# Patient Record
Sex: FEMALE | HISPANIC OR LATINO | URBAN - METROPOLITAN AREA
[De-identification: names, ages, dates, MRNs, and addresses within clinical notes are randomized per-mention and may not be internally consistent; named-entity substitution may affect disease eponyms.]

---

## 2018-07-27 ENCOUNTER — OFFICE VISIT (OUTPATIENT)
Dept: URBAN - METROPOLITAN AREA CLINIC 23 | Facility: CLINIC | Age: 53
End: 2018-07-27

## 2018-07-27 DIAGNOSIS — H35.342 MACULAR CYST, HOLE, OR PSEUDOHOLE, LEFT EYE: ICD-10-CM

## 2018-07-27 PROCEDURE — 99203 OFFICE O/P NEW LOW 30 MIN: CPT | Performed by: OPTOMETRIST

## 2018-07-27 PROCEDURE — 92133 CPTRZD OPH DX IMG PST SGM ON: CPT | Performed by: OPTOMETRIST

## 2018-07-27 RX ORDER — TIMOLOL MALEATE 5 MG/ML
0.5 % SOLUTION/ DROPS OPHTHALMIC
Qty: 0 | Refills: 0 | Status: ACTIVE
Start: 2018-07-27

## 2018-07-27 ASSESSMENT — INTRAOCULAR PRESSURE
OD: 30
OS: 25

## 2018-07-27 NOTE — IMPRESSION/PLAN
Impression: Anatomical narrow angle of bilateral eyes: H40.033. Plan: Discussed findings. Recommend consult for LPI to prevent angle closure and blindness. Start Istalol qd (drop given in office this afternoon) to decrease pressure and treat narrow angle glaucoma. Avoid medications labeled not to use with glaucoma.

## 2018-07-27 NOTE — IMPRESSION/PLAN
Impression: Macular cyst, hole, or pseudohole, left eye: H35.342. Plan: Discussed findings. Consult with retinal specialist when IOP controlled and safe to dilate.

## 2018-07-31 ENCOUNTER — SURGERY (OUTPATIENT)
Dept: URBAN - METROPOLITAN AREA SURGERY 15 | Facility: SURGERY | Age: 53
End: 2018-07-31

## 2018-07-31 ENCOUNTER — OFFICE VISIT (OUTPATIENT)
Dept: URBAN - METROPOLITAN AREA CLINIC 33 | Facility: CLINIC | Age: 53
End: 2018-07-31

## 2018-07-31 DIAGNOSIS — H35.81 RETINAL EDEMA: ICD-10-CM

## 2018-07-31 DIAGNOSIS — H40.033 ANATOMICAL NARROW ANGLE, BILATERAL: Primary | ICD-10-CM

## 2018-07-31 PROCEDURE — 99212 OFFICE O/P EST SF 10 MIN: CPT | Performed by: OPHTHALMOLOGY

## 2018-07-31 PROCEDURE — 66761 REVISION OF IRIS: CPT | Performed by: OPHTHALMOLOGY

## 2018-07-31 ASSESSMENT — INTRAOCULAR PRESSURE
OD: 17
OS: 16
OS: 16
OD: 17

## 2018-07-31 NOTE — IMPRESSION/PLAN
Impression: Retinal edema: H35.81. OS. Condition: new prob, no addtl w/u needed. Plan: Discussed diagnosis in detail with patient. Discussed treatment options with patient. Discussed risks of progression. Consult recommended [Retinal Specialists].

## 2018-07-31 NOTE — IMPRESSION/PLAN
Impression: Anatomical narrow angle, bilateral: H40.033. Symptoms: will improve with surgery. Vision: vision threatening. IOP effectively lowered with Istalol Plan: Discussed diagnosis in detail with patient. Discussed treatment options with patient. Surgical treatment is required. Surgical risks and benefits were discussed, explained and understood by patient. Patient elects to have surgery. RL-2 Continue Timolol QAM OU.

## 2018-08-07 ENCOUNTER — POST-OPERATIVE VISIT (OUTPATIENT)
Dept: URBAN - METROPOLITAN AREA CLINIC 23 | Facility: CLINIC | Age: 53
End: 2018-08-07

## 2018-08-07 DIAGNOSIS — Z09 ENCNTR FOR F/U EXAM AFT TRTMT FOR COND OTH THAN MALIG NEOPLM: Primary | ICD-10-CM

## 2018-08-07 ASSESSMENT — INTRAOCULAR PRESSURE
OS: 14
OD: 23

## 2018-08-24 ENCOUNTER — OFFICE VISIT (OUTPATIENT)
Dept: URBAN - METROPOLITAN AREA CLINIC 23 | Facility: CLINIC | Age: 53
End: 2018-08-24

## 2018-08-24 DIAGNOSIS — H35.372 PUCKERING OF MACULA, LEFT EYE: Primary | ICD-10-CM

## 2018-08-24 PROCEDURE — 92134 CPTRZ OPH DX IMG PST SGM RTA: CPT | Performed by: OPHTHALMOLOGY

## 2018-08-24 PROCEDURE — 92014 COMPRE OPH EXAM EST PT 1/>: CPT | Performed by: OPHTHALMOLOGY

## 2018-08-24 ASSESSMENT — INTRAOCULAR PRESSURE
OD: 18
OS: 16

## 2018-08-24 NOTE — IMPRESSION/PLAN
Impression: Puckering of macula, left eye: H35.372. OS. Condition: stable. Vision: vision not affected. Plan: Discussed diagnosis in detail with patient. Exam OS shows ERM with Pseudohole. No treatment is required at this time. Will continue to observe condition and or symptoms. OCT OS shows irregular foveal contour with Cystic edema OS. If va decreases in the future surgery can be consider, not needed at this time.

## 2018-11-06 ENCOUNTER — OFFICE VISIT (OUTPATIENT)
Dept: URBAN - METROPOLITAN AREA CLINIC 23 | Facility: CLINIC | Age: 53
End: 2018-11-06

## 2018-11-06 DIAGNOSIS — H40.1134 PRIMARY OPEN-ANGLE GLAUCOMA, BILATERAL, INDETERMINATE STAGE: Primary | ICD-10-CM

## 2018-11-06 PROCEDURE — 99213 OFFICE O/P EST LOW 20 MIN: CPT | Performed by: OPTOMETRIST

## 2018-11-06 ASSESSMENT — INTRAOCULAR PRESSURE
OS: 12
OD: 16

## 2018-11-06 NOTE — IMPRESSION/PLAN
Impression: Primary open-angle glaucoma, bilateral, indeterminate stage: H40.1134. Plan: Discussed diagnosis in detail with patient. Discussed treatment options with patient. Reassured patient of current condition and treatment. Will continue to monitor IOP. Continue using current medication(s). Continue istalol QAM OU.

## 2022-08-02 ENCOUNTER — OFFICE VISIT (OUTPATIENT)
Dept: URBAN - METROPOLITAN AREA CLINIC 22 | Facility: CLINIC | Age: 57
End: 2022-08-02

## 2022-08-02 DIAGNOSIS — H40.211 ACUTE ANGLE-CLOSURE GLAUCOMA, RIGHT EYE: Primary | ICD-10-CM

## 2022-08-02 PROCEDURE — 99204 OFFICE O/P NEW MOD 45 MIN: CPT | Performed by: STUDENT IN AN ORGANIZED HEALTH CARE EDUCATION/TRAINING PROGRAM

## 2022-08-02 ASSESSMENT — INTRAOCULAR PRESSURE
OS: 13
OS: 22
OD: 54
OS: 23
OD: 50
OD: 55
OD: 22

## 2022-08-02 NOTE — IMPRESSION/PLAN
Impression: Acute angle-closure glaucoma, right eye: H40.211. Plan: Discussed findings. Elevated IOP 55 OD today. Patient reports onset of pain 4 days ago followed by inferior vision loss. Cupped nerve. Diamox [id number] and Combigan in office, minimal reduction in IOP. Refer to glaucoma specialist today.

## 2022-08-22 ENCOUNTER — TESTING ONLY (OUTPATIENT)
Dept: URBAN - METROPOLITAN AREA CLINIC 23 | Facility: CLINIC | Age: 57
End: 2022-08-22

## 2022-08-22 DIAGNOSIS — H25.13 AGE-RELATED NUCLEAR CATARACT, BILATERAL: Primary | ICD-10-CM

## 2022-08-22 ASSESSMENT — PACHYMETRY
OS: 2.73
OS: 22.00
OD: 2.23
OD: 21.91

## 2022-08-22 ASSESSMENT — VISUAL ACUITY
OD: 20/25
OS: 20/20

## 2022-08-31 ENCOUNTER — SURGERY (OUTPATIENT)
Dept: URBAN - METROPOLITAN AREA SURGERY 11 | Facility: SURGERY | Age: 57
End: 2022-08-31

## 2022-08-31 PROCEDURE — 66174 TRLUML DIL AQ O/F CAN W/O ST: CPT | Performed by: OPHTHALMOLOGY

## 2022-09-01 ENCOUNTER — POST-OPERATIVE VISIT (OUTPATIENT)
Dept: URBAN - METROPOLITAN AREA CLINIC 23 | Facility: CLINIC | Age: 57
End: 2022-09-01

## 2022-09-01 DIAGNOSIS — Z48.810 ENCOUNTER FOR SURGICAL AFTERCARE FOLLOWING SURGERY ON A SENSE ORGAN: Primary | ICD-10-CM

## 2022-09-01 PROCEDURE — 99024 POSTOP FOLLOW-UP VISIT: CPT | Performed by: OPTOMETRIST

## 2022-09-01 RX ORDER — ACETAZOLAMIDE 250 MG/1
250 MG TABLET ORAL
Qty: 20 | Refills: 0 | Status: INACTIVE
Start: 2022-09-01 | End: 2022-09-02

## 2022-09-01 ASSESSMENT — INTRAOCULAR PRESSURE
OD: 47
OS: 20
OD: 48

## 2022-09-01 NOTE — IMPRESSION/PLAN
Impression: S/P Cataract Extraction by phacoemulsification with IOL placement; OMNI Surgical System; Shunt:  Hydrus OD - 1 Day. Encounter for surgical aftercare following surgery on a sense organ  Z48.810. Post operative instructions reviewed - Plan: Use medication as directed above and on handout. IOP elevated today, burped wound but no improvement to IOP. Prescribed Diamox BID x 10 days and advised patient to continue Combigan BID OU. Gave sample of Combigan and placed drop in clinic. Return if vision suddenly changes or pain increases. Recheck IOP tomorrow.

## 2022-09-02 ENCOUNTER — POST-OPERATIVE VISIT (OUTPATIENT)
Dept: URBAN - METROPOLITAN AREA CLINIC 23 | Facility: CLINIC | Age: 57
End: 2022-09-02

## 2022-09-02 PROCEDURE — 99024 POSTOP FOLLOW-UP VISIT: CPT | Performed by: OPTOMETRIST

## 2022-09-02 RX ORDER — ACETAZOLAMIDE 250 MG/1
250 MG TABLET ORAL
Qty: 20 | Refills: 1 | Status: ACTIVE
Start: 2022-09-02

## 2022-09-02 ASSESSMENT — INTRAOCULAR PRESSURE
OD: 32
OS: 13

## 2022-09-02 NOTE — IMPRESSION/PLAN
Impression: S/P Cataract Extraction by phacoemulsification with IOL placement; OMNI Surgical System; Shunt:  Hydrus OD - 2 Days. Encounter for surgical aftercare following surgery on a sense organ  Z48.810. Post operative instructions reviewed - Condition is improving - Plan: Discussed findings. IOP lowered today but still remains elevated. Advised patient to continue Diamox along with drop schedule. Discussed increasing Diamox amount to lower IOP quicker, recommend increasing to TID. Side effects of Diamox discussed, patient to reduce dosage if side effects occur. Call with any worsening pain or vision changes.

## 2022-09-08 ENCOUNTER — POST-OPERATIVE VISIT (OUTPATIENT)
Dept: URBAN - METROPOLITAN AREA CLINIC 23 | Facility: CLINIC | Age: 57
End: 2022-09-08

## 2022-09-08 DIAGNOSIS — Z48.810 ENCOUNTER FOR SURGICAL AFTERCARE FOLLOWING SURGERY ON A SENSE ORGAN: Primary | ICD-10-CM

## 2022-09-08 PROCEDURE — 99024 POSTOP FOLLOW-UP VISIT: CPT | Performed by: OPTOMETRIST

## 2022-09-08 ASSESSMENT — INTRAOCULAR PRESSURE
OD: 21
OS: 10
OD: 22

## 2022-09-08 NOTE — IMPRESSION/PLAN
Impression: S/P Cataract Extraction by phacoemulsification with IOL placement; OMNI Surgical System; Shunt:  Hydrus OD - 8 Days. Encounter for surgical aftercare following surgery on a sense organ  Z48.810. Post operative instructions reviewed - Condition is improving - Plan: Discussed findings. Systemic side effects noted from Diamox, patient to cut back to BID. IOP lowered, continue Combigan. Advised patient there are refills on all drops at pharmacy. Okay to proceed with second eye. Call with any sooner vision changes or increasing pain.

## 2022-09-20 ENCOUNTER — SURGERY (OUTPATIENT)
Dept: URBAN - METROPOLITAN AREA SURGERY 11 | Facility: SURGERY | Age: 57
End: 2022-09-20

## 2022-09-21 ENCOUNTER — POST-OPERATIVE VISIT (OUTPATIENT)
Dept: URBAN - METROPOLITAN AREA CLINIC 23 | Facility: CLINIC | Age: 57
End: 2022-09-21

## 2022-09-21 DIAGNOSIS — Z48.810 ENCOUNTER FOR SURGICAL AFTERCARE FOLLOWING SURGERY ON A SENSE ORGAN: Primary | ICD-10-CM

## 2022-09-21 PROCEDURE — 99024 POSTOP FOLLOW-UP VISIT: CPT | Performed by: OPTOMETRIST

## 2022-09-21 RX ORDER — BRINZOLAMIDE/BRIMONIDINE TARTRATE 10; 2 MG/ML; MG/ML
SUSPENSION/ DROPS OPHTHALMIC
Qty: 5 | Refills: 0 | Status: ACTIVE
Start: 2022-09-21

## 2022-09-21 RX ORDER — TIMOLOL 5.12 MG/ML
0.5 % SOLUTION/ DROPS OPHTHALMIC
Qty: 5 | Refills: 0 | Status: ACTIVE
Start: 2022-09-21

## 2022-09-21 ASSESSMENT — INTRAOCULAR PRESSURE
OS: 10
OD: 33

## 2022-09-21 NOTE — IMPRESSION/PLAN
Impression: S/P Cataract Extraction by phacoemulsification with IOL placement; OMNI Surgery System; Shunt:  Hydrus OS - 1 Day. Encounter for surgical aftercare following surgery on a sense organ  Z48.810. Post operative instructions reviewed - Plan: Discussed findings. IOP still elevated in OD, patient to stop Combigan and begin Simbrinza BID OD and Betimol BID OD, samples given. Begin new post-surgery drops in OS. Call with any worsening vision or pain.

## 2022-09-28 ENCOUNTER — POST-OPERATIVE VISIT (OUTPATIENT)
Dept: URBAN - METROPOLITAN AREA CLINIC 23 | Facility: CLINIC | Age: 57
End: 2022-09-28

## 2022-09-28 DIAGNOSIS — Z48.810 ENCOUNTER FOR SURGICAL AFTERCARE FOLLOWING SURGERY ON A SENSE ORGAN: Primary | ICD-10-CM

## 2022-09-28 PROCEDURE — 99024 POSTOP FOLLOW-UP VISIT: CPT | Performed by: OPTOMETRIST

## 2022-09-28 ASSESSMENT — INTRAOCULAR PRESSURE
OS: 11
OD: 8

## 2022-09-28 NOTE — IMPRESSION/PLAN
Impression: S/P CE/Standard IOL CC60WF +26.50 w/ OMNI Surgery System; Shunt: Hydrus OS - 8 Days. Encounter for surgical aftercare following surgery on a sense organ  Z48.810. Post operative instructions reviewed - Condition is improving - Plan: Discussed findings. IOP lowered today, discussed need to reduce acetazolamide. Taper to QD, continue all glaucoma drops and post surgical drops. If IOP remains low, will likely remove acetazolamide fully at next visit. Discussed need to return with Dr. Fabiola Brito before PO period ends.

## 2022-10-19 ENCOUNTER — POST-OPERATIVE VISIT (OUTPATIENT)
Dept: URBAN - METROPOLITAN AREA CLINIC 23 | Facility: CLINIC | Age: 57
End: 2022-10-19

## 2022-10-19 DIAGNOSIS — Z48.810 ENCOUNTER FOR SURGICAL AFTERCARE FOLLOWING SURGERY ON A SENSE ORGAN: Primary | ICD-10-CM

## 2022-10-19 PROCEDURE — 92083 EXTENDED VISUAL FIELD XM: CPT | Performed by: OPTOMETRIST

## 2022-10-19 PROCEDURE — 99024 POSTOP FOLLOW-UP VISIT: CPT | Performed by: OPTOMETRIST

## 2022-10-19 RX ORDER — TIMOLOL MALEATE 5 MG/ML
0.5 % SOLUTION/ DROPS OPHTHALMIC
Qty: 5 | Refills: 3 | Status: ACTIVE
Start: 2022-10-19

## 2022-10-19 ASSESSMENT — INTRAOCULAR PRESSURE
OD: 17
OS: 13

## 2022-10-19 NOTE — IMPRESSION/PLAN
Impression: S/P CE/Standard IOL CC60WF +26.50 w/ OMNI Surgery System; Shunt: Hydrus OS - 29 Days. Encounter for surgical aftercare following surgery on a sense organ  Z48.810. Post operative instructions reviewed - Plan: Discussed findings. Reviewed drop instructions with patient. IOP in normal range with use of medications. Stop acetazolamide due to side effects and return for IOP check next week. patient

## 2022-10-26 ENCOUNTER — POST-OPERATIVE VISIT (OUTPATIENT)
Dept: URBAN - METROPOLITAN AREA CLINIC 23 | Facility: CLINIC | Age: 57
End: 2022-10-26

## 2022-10-26 DIAGNOSIS — Z48.810 ENCOUNTER FOR SURGICAL AFTERCARE FOLLOWING SURGERY ON A SENSE ORGAN: Primary | ICD-10-CM

## 2022-10-26 PROCEDURE — 99024 POSTOP FOLLOW-UP VISIT: CPT | Performed by: OPTOMETRIST

## 2022-10-26 RX ORDER — BRIMONIDINE TARTRATE 1.5 MG/ML
0.15 % SOLUTION/ DROPS OPHTHALMIC
Qty: 10 | Refills: 0 | Status: ACTIVE
Start: 2022-10-26

## 2022-10-26 ASSESSMENT — INTRAOCULAR PRESSURE
OS: 9
OD: 9

## 2022-10-26 NOTE — IMPRESSION/PLAN
Impression: S/P CE/Standard IOL CC60WF +26.50 w/ OMNI Surgery System; Shunt: Hydrus OS - 36 Days. Encounter for surgical aftercare following surgery on a sense organ  Z48.810. Excellent post op course   Condition is improving - Plan: Discussed findings. Adjusted glaucoma drops, IOP remaining low. Sent new drops to pharmacy. Call with any worsening symptoms.

## 2022-11-04 ENCOUNTER — POST-OPERATIVE VISIT (OUTPATIENT)
Dept: URBAN - METROPOLITAN AREA CLINIC 24 | Facility: CLINIC | Age: 57
End: 2022-11-04

## 2022-11-04 DIAGNOSIS — H40.1121 PRIMARY OPEN-ANGLE GLAUCOMA, LEFT EYE, MILD STAGE: ICD-10-CM

## 2022-11-04 DIAGNOSIS — H40.1112 PRIMARY OPEN-ANGLE GLAUCOMA, RIGHT EYE, MODERATE STAGE: Primary | ICD-10-CM

## 2022-11-04 PROCEDURE — 99214 OFFICE O/P EST MOD 30 MIN: CPT | Performed by: OPHTHALMOLOGY

## 2022-11-04 RX ORDER — PREDNISOLONE ACETATE 10 MG/ML
1 % SUSPENSION/ DROPS OPHTHALMIC
Qty: 5 | Refills: 1 | Status: ACTIVE
Start: 2022-11-04

## 2022-11-04 RX ORDER — ERYTHROMYCIN 5 MG/G
OINTMENT OPHTHALMIC
Qty: 3.5 | Refills: 1 | Status: ACTIVE
Start: 2022-11-04

## 2022-11-04 RX ORDER — OFLOXACIN 3 MG/ML
0.3 % SOLUTION/ DROPS OPHTHALMIC
Qty: 5 | Refills: 1 | Status: ACTIVE
Start: 2022-11-04

## 2022-11-04 ASSESSMENT — INTRAOCULAR PRESSURE
OD: 20
OS: 9

## 2022-11-04 NOTE — IMPRESSION/PLAN
Impression: Primary open-angle glaucoma, right eye, moderate stage: H40.1112. Plan: Pt has Glaucoma    Gonio :  ANU OD/TM OS      Pachs:  422/240    Today's IOP : 20, 9        Tmax  :  55/25 Target IOP mid to low teens Pt denies Fhx of Glaucoma Left eye is the better seeing eye  
C/D: .9x.9//.45x.45 
OCT: 79/97 (8/2/22) Pt denies Sulfa Allergy   // Pt denies Lung /Heart dx Plan :
1. Continue Brim BID OU Timolol BID OU 2. Patient's IOP OD is too high for amount of glaucoma damage. Recommend BAERVELDT Tube Shunt to lower IOP. The Patient is aware that the risks of BAERVELDT tube shunt OD include but are not limited to complete blindness , loss of vision and loss of the eye, bleeding, infection, inflammation, Hypotony, Persistent IOP elevation, intractable diplopia , and orbital or ocular inflammation. The Patient is aware that there is a 10 % chance of permanent double vision which cannot be remedied by any means including glasses or strabismus surgery. The patient is aware that failure to lower IOP will likely lead to progressive sight loss and possibly blindness . Pt understands and wishes to proceed. ** Patient to use Pred QID x 6 weeks THEN taper as instructed by Dr. Eileen Villalta **PCC's Please notify OR to order device 3.  Schedule Baerveldt Tube + K Patch OD

## 2022-11-16 ENCOUNTER — POST-OPERATIVE VISIT (OUTPATIENT)
Dept: URBAN - METROPOLITAN AREA CLINIC 23 | Facility: CLINIC | Age: 57
End: 2022-11-16

## 2022-11-16 DIAGNOSIS — Z48.810 ENCOUNTER FOR SURGICAL AFTERCARE FOLLOWING SURGERY ON A SENSE ORGAN: Primary | ICD-10-CM

## 2022-11-16 PROCEDURE — 99024 POSTOP FOLLOW-UP VISIT: CPT | Performed by: OPTOMETRIST

## 2022-11-16 ASSESSMENT — INTRAOCULAR PRESSURE
OD: 21
OS: 12

## 2022-11-16 NOTE — IMPRESSION/PLAN
Impression: S/P CE/Standard IOL CC60WF +26.50 w/ OMNI Surgery System; Shunt: Hydrus OS - 57 Days. Encounter for surgical aftercare following surgery on a sense organ  Z48.810. Condition is improving - Plan: Discussed findings. IOLs stable. Wait on refraction until after eye has healed from Baerveldt surgery in OD. Call with any problems.

## 2022-11-24 ENCOUNTER — POST-OPERATIVE VISIT (OUTPATIENT)
Dept: URBAN - METROPOLITAN AREA CLINIC 23 | Facility: CLINIC | Age: 57
End: 2022-11-24

## 2022-11-24 DIAGNOSIS — Z48.810 ENCOUNTER FOR SURGICAL AFTERCARE FOLLOWING SURGERY ON A SENSE ORGAN: Primary | ICD-10-CM

## 2022-11-24 PROCEDURE — 99024 POSTOP FOLLOW-UP VISIT: CPT | Performed by: OPTOMETRIST

## 2022-11-24 ASSESSMENT — INTRAOCULAR PRESSURE
OS: 14
OD: 18

## 2022-11-24 NOTE — IMPRESSION/PLAN
Impression: S/P Shunt:  Baerveldt with CorneaGen (halfmoon) graft OD - 1 Day. Encounter for surgical aftercare following surgery on a sense organ  Z48.810. Plan: The surgical eye(s) is improving well. Continue to follow management and post operative instructions. Start Pred Acetate QID OD and Ofloxacin QID OD. Continue Brimonidine BID OD and Timolol BID OD. Recommend artificial tears throughout post operative period. RTC for scheduled follow up.

## 2022-11-30 ENCOUNTER — POST-OPERATIVE VISIT (OUTPATIENT)
Dept: URBAN - METROPOLITAN AREA CLINIC 23 | Facility: CLINIC | Age: 57
End: 2022-11-30

## 2022-11-30 DIAGNOSIS — Z48.810 ENCOUNTER FOR SURGICAL AFTERCARE FOLLOWING SURGERY ON A SENSE ORGAN: Primary | ICD-10-CM

## 2022-11-30 PROCEDURE — 99024 POSTOP FOLLOW-UP VISIT: CPT | Performed by: OPTOMETRIST

## 2022-11-30 RX ORDER — BRIMONIDINE TARTRATE 1.5 MG/ML
0.15 % SOLUTION/ DROPS OPHTHALMIC
Qty: 10 | Refills: 6 | Status: ACTIVE
Start: 2022-11-30

## 2022-11-30 RX ORDER — PREDNISOLONE ACETATE 10 MG/ML
1 % SUSPENSION/ DROPS OPHTHALMIC
Qty: 10 | Refills: 3 | Status: ACTIVE
Start: 2022-11-30

## 2022-11-30 RX ORDER — TIMOLOL MALEATE 5 MG/ML
0.5 % SOLUTION/ DROPS OPHTHALMIC
Qty: 5 | Refills: 3 | Status: ACTIVE
Start: 2022-11-30

## 2022-11-30 ASSESSMENT — INTRAOCULAR PRESSURE
OD: 32
OS: 22
OD: 35

## 2022-11-30 NOTE — IMPRESSION/PLAN
Impression: S/P Shunt:  Baerveldt with CorneaGen (halfmoon) graft OD - 7 Days. Encounter for surgical aftercare following surgery on a sense organ  Z48.810. Post operative instructions reviewed - Plan: Use medication as directed above and on handout. Return if vision suddenly changes or pain increases. Discontinue Ofloxacin 0.3%----Continue Prednisolone acetate 1% qid, brimonidine bid, timolol bid.  Start acetazolamide bid

## 2022-12-06 ENCOUNTER — OFFICE VISIT (OUTPATIENT)
Dept: URBAN - METROPOLITAN AREA CLINIC 23 | Facility: CLINIC | Age: 57
End: 2022-12-06

## 2022-12-06 DIAGNOSIS — H40.1121 PRIMARY OPEN-ANGLE GLAUCOMA, LEFT EYE, MILD STAGE: ICD-10-CM

## 2022-12-06 DIAGNOSIS — H40.1112 PRIMARY OPEN-ANGLE GLAUCOMA, RIGHT EYE, MODERATE STAGE: Primary | ICD-10-CM

## 2022-12-06 PROCEDURE — 99024 POSTOP FOLLOW-UP VISIT: CPT | Performed by: OPHTHALMOLOGY

## 2022-12-06 ASSESSMENT — INTRAOCULAR PRESSURE
OS: 13
OD: 24

## 2022-12-06 NOTE — IMPRESSION/PLAN
Impression: Primary open-angle glaucoma, right eye, moderate stage: H40.1112.
s/p Baerveldt OD Jorene Shock) 11/23/22 Plan: Pt has Glaucoma    Gonio :  ANU OD/TM OS      Pachs:  511/525    Today's IOP : 24/13        Tmax  :  55/25 Target IOP mid to low teens Pt denies Fhx of Glaucoma Left eye is the better seeing eye  
C/D: .9x.9//.45x.45 
OCT: 79/97 (8/2/22) Pt denies Sulfa Allergy   // Pt denies Lung /Heart dx Plan :
1. Continue Brim BID OU Timolol BID OU Diamox 250mg  BID (until 12/21/22) 2. IOP OD improved. Healing well. 3. Pt states she has been rubbing the eye when irritated - discussed not rubbing due to potential of breaking sutures 4.  Return in 1 month for IOP check or PRN

## 2022-12-30 ENCOUNTER — POST-OPERATIVE VISIT (OUTPATIENT)
Dept: URBAN - METROPOLITAN AREA CLINIC 23 | Facility: CLINIC | Age: 57
End: 2022-12-30

## 2022-12-30 DIAGNOSIS — Z48.810 ENCOUNTER FOR SURGICAL AFTERCARE FOLLOWING SURGERY ON A SENSE ORGAN: Primary | ICD-10-CM

## 2022-12-30 PROCEDURE — 99024 POSTOP FOLLOW-UP VISIT: CPT | Performed by: OPTOMETRIST

## 2022-12-30 RX ORDER — ACETAZOLAMIDE 250 MG/1
250 MG TABLET ORAL
Qty: 21 | Refills: 1 | Status: ACTIVE
Start: 2022-12-30

## 2022-12-30 ASSESSMENT — INTRAOCULAR PRESSURE
OD: 38
OS: 16

## 2022-12-30 NOTE — IMPRESSION/PLAN
Impression: S/P Shunt:  Baerveldt with CorneaGen (halfmoon) graft OD - 37 Days. Encounter for surgical aftercare following surgery on a sense organ  Z48.810. Plan: Pt edu. Begin Diamox TID PO again and continue all drops. RTC as scheduled with Dr. Aaliyah Black regarding continued elevated IOP. Alternate between Tylenol and Advil for pain.

## 2023-01-03 ENCOUNTER — POST-OPERATIVE VISIT (OUTPATIENT)
Dept: URBAN - METROPOLITAN AREA CLINIC 23 | Facility: CLINIC | Age: 58
End: 2023-01-03

## 2023-01-03 DIAGNOSIS — H40.1112 PRIMARY OPEN-ANGLE GLAUCOMA, RIGHT EYE, MODERATE STAGE: Primary | ICD-10-CM

## 2023-01-03 PROCEDURE — 99024 POSTOP FOLLOW-UP VISIT: CPT | Performed by: OPHTHALMOLOGY

## 2023-01-03 RX ORDER — ATROPINE SULFATE 10 MG/ML
1 % SOLUTION/ DROPS OPHTHALMIC
Qty: 5 | Refills: 0 | Status: ACTIVE
Start: 2023-01-03

## 2023-01-03 NOTE — IMPRESSION/PLAN
Impression: Primary open-angle glaucoma, right eye, moderate stage: H40.1112.
s/p Baerveldt OD Jordon Dayhoff) 11/23/22 Plan: Pt has Glaucoma    Gonio :  ANU OD/TM OS      Pachs:  511/525    Today's IOP OD 11     Tmax  :  55/25 Target IOP mid to low teens Pt denies Fhx of Glaucoma Left eye is the better seeing eye  
C/D: .9x.9//.45x.45 
OCT: 79/97 (8/2/22) Pt denies Sulfa Allergy   // Pt denies Lung /Heart dx Plan :
1. Continue Brim BID OS Timolol BID OS Pred QID OD Atropine BID OD 
STOP Diamox 250mg  BID 2. s/p Baerveldt OD Jordon Dayhoff) 11/23/22, Tube opened as of 1/3/23 3.

## 2023-01-05 ENCOUNTER — POST-OPERATIVE VISIT (OUTPATIENT)
Dept: URBAN - METROPOLITAN AREA CLINIC 24 | Facility: CLINIC | Age: 58
End: 2023-01-05

## 2023-01-05 DIAGNOSIS — H44.431 HYPOTONY OF EYE DUE TO OTHER OCULAR DISORDERS, RIGHT EYE: ICD-10-CM

## 2023-01-05 PROCEDURE — 99024 POSTOP FOLLOW-UP VISIT: CPT | Performed by: OPHTHALMOLOGY

## 2023-01-05 ASSESSMENT — INTRAOCULAR PRESSURE: OD: 53

## 2023-01-05 NOTE — IMPRESSION/PLAN
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 Plan: Patient presents as hypotonous. Healon injection required to reform anterior chamber. Oflox and Procaine instilled before procedure. Patient tolerated injection well. Oflox instilled following procedure.

## 2023-01-05 NOTE — IMPRESSION/PLAN
Impression: Primary open-angle glaucoma, right eye, moderate stage: H40.1112.
s/p Baerveldt OD Jt Clan) 11/23/22 Plan: Pt has Glaucoma    Gonio :  ANU OD/TM OS      Pachs:  511/525    Today's IOP OD flat     Tmax  :  55/25 Target IOP mid to low teens Pt denies Fhx of Glaucoma Left eye is the better seeing eye  
C/D: .9x.9//.45x.45 
OCT: 79/97 (8/2/22) Pt denies Sulfa Allergy   // Pt denies Lung /Heart dx Plan :
1. Continue Brim BID OS Timolol BID OS Pred QID OD Atropine BID OD 2. s/p Baerveldt OD Jt George) 11/23/22, Tube opened as of 1/3/23. Bleb is mildly elevated and A/C flat. (was on Diamox when tube opened) 3. PROCEDURE NOTE:
- Healon injected OD. LOT #: [id number]. 27G x 1/2 used. Oflox pre and post injection. A/C inflated.

## 2023-01-11 ENCOUNTER — OFFICE VISIT (OUTPATIENT)
Dept: URBAN - METROPOLITAN AREA CLINIC 24 | Facility: CLINIC | Age: 58
End: 2023-01-11

## 2023-01-11 DIAGNOSIS — H40.1112 PRIMARY OPEN-ANGLE GLAUCOMA, RIGHT EYE, MODERATE STAGE: Primary | ICD-10-CM

## 2023-01-11 DIAGNOSIS — H44.431 HYPOTONY OF EYE DUE TO OTHER OCULAR DISORDERS, RIGHT EYE: ICD-10-CM

## 2023-01-11 PROCEDURE — 99024 POSTOP FOLLOW-UP VISIT: CPT | Performed by: OPHTHALMOLOGY

## 2023-01-11 RX ORDER — PREDNISOLONE ACETATE 10 MG/ML
1 % SUSPENSION/ DROPS OPHTHALMIC
Qty: 10 | Refills: 3 | Status: ACTIVE
Start: 2023-01-11

## 2023-01-11 ASSESSMENT — INTRAOCULAR PRESSURE: OD: 1

## 2023-01-11 NOTE — IMPRESSION/PLAN
Impression: Primary open-angle glaucoma, right eye, moderate stage: H40.1112.
s/p Baerveldt OD Maria G Ditch) 11/23/22 Plan: Pt has Glaucoma    Gonio :  ANU OD/TM OS      Pachs:  511/525    Today's IOP OD flat     Tmax  :  55/25 Target IOP mid to low teens Pt denies Fhx of Glaucoma Left eye is the better seeing eye  
C/D: .9x.9//.45x.45 
OCT: 79/97 (8/2/22) Pt denies Sulfa Allergy   // Pt denies Lung /Heart dx Plan :
1. Continue Brim BID OS Timolol BID OS Pred Q1H OD Atropine BID OD 2. s/p Baerveldt OD Maria G Ditch) 11/23/22, Tube opened as of 1/3/23. Bleb is mildly elevated and A/C flat. (was on Diamox when tube opened) 3. IOP is low OD. Increase Pred to Q1H
4. RTC in 1 week for IOP check.  If IOP is still too low, will recommend injection

## 2023-01-19 ENCOUNTER — OFFICE VISIT (OUTPATIENT)
Dept: URBAN - METROPOLITAN AREA CLINIC 23 | Facility: CLINIC | Age: 58
End: 2023-01-19

## 2023-01-19 DIAGNOSIS — H44.431 HYPOTONY OF EYE DUE TO OTHER OCULAR DISORDERS, RIGHT EYE: Primary | ICD-10-CM

## 2023-01-19 PROCEDURE — 99024 POSTOP FOLLOW-UP VISIT: CPT | Performed by: OPHTHALMOLOGY

## 2023-01-19 ASSESSMENT — INTRAOCULAR PRESSURE
OS: 21
OD: 0

## 2023-01-19 NOTE — IMPRESSION/PLAN
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 Plan: Patient presents as hypotonous. Healon injection required to reform anterior chamber. Oflox and Procaine instilled before procedure. Patient tolerated injection well. Oflox instilled following procedure. 

IF hypotonous again will send to retina for aqueous misdirection

## 2023-01-25 ENCOUNTER — POST-OPERATIVE VISIT (OUTPATIENT)
Dept: URBAN - METROPOLITAN AREA CLINIC 24 | Facility: CLINIC | Age: 58
End: 2023-01-25

## 2023-01-25 ENCOUNTER — OFFICE VISIT (OUTPATIENT)
Dept: URBAN - METROPOLITAN AREA CLINIC 24 | Facility: CLINIC | Age: 58
End: 2023-01-25

## 2023-01-25 DIAGNOSIS — H44.431 HYPOTONY OF EYE DUE TO OTHER OCULAR DISORDERS, RIGHT EYE: ICD-10-CM

## 2023-01-25 DIAGNOSIS — H40.1112 PRIMARY OPEN-ANGLE GLAUCOMA, RIGHT EYE, MODERATE STAGE: Primary | ICD-10-CM

## 2023-01-25 PROCEDURE — 99024 POSTOP FOLLOW-UP VISIT: CPT | Performed by: OPHTHALMOLOGY

## 2023-01-25 PROCEDURE — 99214 OFFICE O/P EST MOD 30 MIN: CPT | Performed by: OPHTHALMOLOGY

## 2023-01-25 PROCEDURE — 92134 CPTRZ OPH DX IMG PST SGM RTA: CPT | Performed by: OPHTHALMOLOGY

## 2023-01-25 ASSESSMENT — INTRAOCULAR PRESSURE
OS: 21
OS: 21
OD: 0
OD: 0

## 2023-01-25 NOTE — IMPRESSION/PLAN
Impression: Glaucoma, right eye, moderate s/p Baerveldt OD Gina Ear) 11/23/22 Aqueous misdirection Plan: Glaucoma  Tmax  :  55/25 -- H/o Baerveldt OD Gina Ear) 11/23/22, Tube opened as of 1/3/23. Bleb is mildly elevated and A/C flat no improvement after 2 rounds of healon -- Consider Aqueous misdirection. Consider VIT w STTA Kenalog. ATYPICAL in that IOP is low. VIT may not solve problem completely, but can equalize IOP between VIt and AC creating unicameral eye, adding DEX / STTA. LONG d/w pt in [place] of all r/b/a incl risk of IOP Rise and VA Loss / blindness, and risk of IOP remaining low and hypotony retinopathy / choroidals. Extensive review.   Pt understands and opts for surgery: 
   Plan VIT / [place] [employer] / Leocadia Coaster / Dex OD

## 2023-01-25 NOTE — IMPRESSION/PLAN
Impression: Hypotony of eye Plan: Patient presents as hypotonous.  Healon injection x2 no improvement

## 2023-01-25 NOTE — IMPRESSION/PLAN
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 Plan: Patient presents as hypotonous. Healon injectionx2 no improvement. will refer patient to Dr. Toni Jacobo.

## 2023-01-25 NOTE — IMPRESSION/PLAN
Impression: Primary open-angle glaucoma, right eye, moderate stage: H40.1112.
s/p Baerveldt OD Kennyth Na) 11/23/22 Plan: Pt has Glaucoma    Gonio :  ANU OD/TM OS      Pachs:  511/525    Today's IOP OD flat     Tmax  :  55/25 Target IOP mid to low teens Pt denies Fhx of Glaucoma Left eye is the better seeing eye  
C/D: .9x.9//.45x.45 
OCT: 79/97 (8/2/22) Pt denies Sulfa Allergy   // Pt denies Lung /Heart dx Plan :
1. Continue Brim BID OS Timolol BID OS Pred Q1H OD Atropine BID OD 2. s/p Baerveldt OD Kennyth Na) 11/23/22, Tube opened as of 1/3/23. Bleb is mildly elevated and A/C flat no improvement after 2 rounds of healon. 3. IOP is low OD. Will refer to Dr. Chelsea Altman

## 2023-02-03 ENCOUNTER — POST-OPERATIVE VISIT (OUTPATIENT)
Dept: URBAN - METROPOLITAN AREA CLINIC 24 | Facility: CLINIC | Age: 58
End: 2023-02-03

## 2023-02-03 DIAGNOSIS — Z48.810 ENCOUNTER FOR SURGICAL AFTERCARE FOLLOWING SURGERY ON A SENSE ORGAN: Primary | ICD-10-CM

## 2023-02-03 PROCEDURE — 99024 POSTOP FOLLOW-UP VISIT: CPT | Performed by: OPTOMETRIST

## 2023-02-03 ASSESSMENT — INTRAOCULAR PRESSURE: OD: 7

## 2023-02-03 NOTE — IMPRESSION/PLAN
Impression: S/P Posterior Vitrectomy (PPVIT)/laser/Dex/AC Washout/Capsulotomy OD - 1 Day. Encounter for surgical aftercare following surgery on a sense organ  Z48.810. Plan: PO instructions reviewed. Oflox sample provided.

## 2023-02-16 ENCOUNTER — POST-OPERATIVE VISIT (OUTPATIENT)
Dept: URBAN - METROPOLITAN AREA CLINIC 24 | Facility: CLINIC | Age: 58
End: 2023-02-16

## 2023-02-16 DIAGNOSIS — H44.431 HYPOTONY OF EYE DUE TO OTHER OCULAR DISORDERS, RIGHT EYE: ICD-10-CM

## 2023-02-16 DIAGNOSIS — H43.813 VITREOUS DEGENERATION, BILATERAL: ICD-10-CM

## 2023-02-16 DIAGNOSIS — H40.1112 PRIMARY OPEN-ANGLE GLAUCOMA, RIGHT EYE, MODERATE STAGE: Primary | ICD-10-CM

## 2023-02-16 PROCEDURE — 92134 CPTRZ OPH DX IMG PST SGM RTA: CPT | Performed by: OPHTHALMOLOGY

## 2023-02-16 PROCEDURE — 99024 POSTOP FOLLOW-UP VISIT: CPT | Performed by: OPHTHALMOLOGY

## 2023-02-16 ASSESSMENT — INTRAOCULAR PRESSURE
OS: 22
OD: 9

## 2023-02-16 NOTE — IMPRESSION/PLAN
Impression: Glaucoma, right eye, moderate s/p Baerveldt OD Cara Sierra) 11/23/22 Aqu. misdirection RESOLVED w Vit '23 Plan: Glaucoma  Tmax  :  55/25 -- H/o Baerveldt OD Cara Sierra) 11/23/22, Tube opened as of 1/3/23. Bleb is mildly elevated and A/C flat no improvement after 2 rounds of healon -- Consider Aqueous misdirection but ATYPICAL -- pt was reminded -- VIT may not solve problem completely, but can equalize IOP between VIt and AC creating unicameral eye, adding DEX / STTA. LONG d/w pt in [place] of all r/b/a incl risk of IOP Rise and VA Loss / blindness, and risk of IOP remaining low and hypotony retinopathy / choroidals. Extensive review. Pt understood. DONE w surgery. Aqu. misdirection RESOLVED w Vit '23 -- IMPROVED VA / IOP 9. 
    TODAY IOP EXCELLENT, Choroidals resolving, IOP normalized. PI good. AC OPEN
    TODAY postop   NO CME postop -- IMPROVED. Finish Gtts. RETINA f/u PRN -- F/u GLAUCOMA in 2w.

## 2023-03-02 ENCOUNTER — OFFICE VISIT (OUTPATIENT)
Dept: URBAN - METROPOLITAN AREA CLINIC 24 | Facility: CLINIC | Age: 58
End: 2023-03-02

## 2023-03-02 DIAGNOSIS — H40.1112 PRIMARY OPEN-ANGLE GLAUCOMA, RIGHT EYE, MODERATE STAGE: Primary | ICD-10-CM

## 2023-03-02 DIAGNOSIS — H44.431 HYPOTONY OF EYE DUE TO OTHER OCULAR DISORDERS, RIGHT EYE: ICD-10-CM

## 2023-03-02 DIAGNOSIS — H18.221 IDIOPATHIC CORNEAL EDEMA, RIGHT EYE: ICD-10-CM

## 2023-03-02 PROCEDURE — 99024 POSTOP FOLLOW-UP VISIT: CPT | Performed by: OPHTHALMOLOGY

## 2023-03-02 ASSESSMENT — INTRAOCULAR PRESSURE
OS: 29
OD: 12

## 2023-03-02 NOTE — IMPRESSION/PLAN
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 Plan: 2/2 hypotony and shallow chamber s/p tube shunt Start Rosangela 128 TID OD If no improvement then consider cornea consult for possible DSEAK

## 2023-03-02 NOTE — IMPRESSION/PLAN
Impression: Primary open-angle glaucoma, right eye, moderate stage: Z61.8103.
s/p Baerveldt OD [place] Push) 11/23/22 - aqueous misdirection - s/p PPV with Dr. Bernice Mohr Plan: Pt has Glaucoma    Gonio :  ANU OD/TM OS      Pachs:  334/520    Today's IOP: 12/29    Tmax  :  55/25 Target IOP mid to low teens Pt denies Fhx of Glaucoma Left eye is the better seeing eye  
C/D: .9x.9//.45x.45 
OCT: 79/97 (8/2/22) Pt denies Sulfa Allergy   // Pt denies Lung /Heart dx Plan :
1. Continue Brim BID OS Timolol BID OS 2. s/p Baerveldt OD Neto Push) 11/23/22, Tube opened as of 1/3/23. Bleb is mildly elevated and A/C flat no improvement after 2 rounds of healon. Aqueous misdirection - reversed after PPV with Dr. Bernice Mohr.  
3. IOP excellent OD and high OS - will restart drops OS

## 2023-03-30 ENCOUNTER — OFFICE VISIT (OUTPATIENT)
Dept: URBAN - METROPOLITAN AREA CLINIC 24 | Facility: CLINIC | Age: 58
End: 2023-03-30

## 2023-03-30 DIAGNOSIS — H40.1112 PRIMARY OPEN-ANGLE GLAUCOMA, RIGHT EYE, MODERATE STAGE: Primary | ICD-10-CM

## 2023-03-30 DIAGNOSIS — H18.221 IDIOPATHIC CORNEAL EDEMA, RIGHT EYE: ICD-10-CM

## 2023-03-30 PROCEDURE — 99024 POSTOP FOLLOW-UP VISIT: CPT | Performed by: OPHTHALMOLOGY

## 2023-03-30 RX ORDER — TIMOLOL MALEATE 5 MG/ML
0.5 % SOLUTION/ DROPS OPHTHALMIC
Qty: 5 | Refills: 3 | Status: ACTIVE
Start: 2023-03-30

## 2023-03-30 RX ORDER — BRIMONIDINE TARTRATE 1.5 MG/ML
0.15 % SOLUTION/ DROPS OPHTHALMIC
Qty: 10 | Refills: 6 | Status: ACTIVE
Start: 2023-03-30

## 2023-03-30 ASSESSMENT — INTRAOCULAR PRESSURE
OS: 26
OD: 15

## 2023-03-30 NOTE — IMPRESSION/PLAN
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 Plan: 2/2 hypotony and shallow chamber s/p tube shunt with aqueous misdirection Cont Rosangela 128 TID OD If no improvement then consider cornea consult for possible DSEAK

## 2023-03-30 NOTE — IMPRESSION/PLAN
Impression: Primary open-angle glaucoma, right eye, moderate stage: E76.7727.
s/p Baerveldt OD Shaylee Perry) 11/23/22 - aqueous misdirection - s/p PPV with Dr. Sally Rutledge Plan: Pt has Glaucoma    Gonio :  ANU OD/TM OS      Pachs:  521/459    Today's IOP: 15/26    Tmax  :  55/29 Target IOP mid to low teens Pt denies Fhx of Glaucoma Left eye is the better seeing eye  
C/D: .9x.9//.35x.35 
OCT: 79/97 (8/2/22) Pt denies Sulfa Allergy   // Pt denies Lung /Heart dx Plan :
1. Continue Brim BID OS Timolol BID OS 2. s/p Baerveldt OD Shaylee Perry) 11/23/22, Tube opened as of 1/3/23. Bleb is mildly elevated and A/C flat no improvement after 2 rounds of healon. Aqueous misdirection - reversed after PPV with Dr. Sally Rutledge. 3. IOP excellent OD and improved OS (still slightly high but ONH healthy with small cup - ctm) 4.  Recommend consult w/ cornea specialist 2/2 chronic edema centrally - may need DSEAK with tube trim OD